# Patient Record
Sex: FEMALE | Race: WHITE | Employment: PART TIME | URBAN - METROPOLITAN AREA
[De-identification: names, ages, dates, MRNs, and addresses within clinical notes are randomized per-mention and may not be internally consistent; named-entity substitution may affect disease eponyms.]

---

## 2018-06-20 ENCOUNTER — HOSPITAL ENCOUNTER (OUTPATIENT)
Dept: GENERAL RADIOLOGY | Age: 58
Discharge: HOME | End: 2018-06-20
Attending: FAMILY MEDICINE
Payer: OTHER MISCELLANEOUS

## 2018-06-20 ENCOUNTER — HOSPITAL ENCOUNTER (EMERGENCY)
Age: 58
Discharge: STILL ADMITTED OR ADMINISTRATIVE | End: 2018-06-20

## 2018-06-20 ENCOUNTER — OFFICE VISIT (OUTPATIENT)
Dept: OTHER | Age: 58
End: 2018-06-20
Attending: FAMILY MEDICINE
Payer: OTHER MISCELLANEOUS
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
## 2018-06-20 PROCEDURE — 73110 X-RAY EXAM OF WRIST: CPT | Performed by: FAMILY MEDICINE

## 2018-06-20 PROCEDURE — 73562 X-RAY EXAM OF KNEE 3: CPT | Performed by: FAMILY MEDICINE

## 2018-06-20 RX ORDER — NAPROXEN 500 MG/1
500 TABLET ORAL 2 TIMES DAILY
Qty: 30 TABLET | Refills: 0 | Status: SHIPPED | OUTPATIENT
Start: 2018-06-20 | End: 2019-05-28

## 2018-06-27 ENCOUNTER — APPOINTMENT (OUTPATIENT)
Dept: OTHER | Age: 58
End: 2018-06-27
Attending: FAMILY MEDICINE
Payer: OTHER MISCELLANEOUS

## 2019-05-28 PROCEDURE — 82955 ASSAY OF G6PD ENZYME: CPT | Performed by: INTERNAL MEDICINE

## 2019-09-19 PROBLEM — D12.0 BENIGN NEOPLASM OF CECUM: Status: ACTIVE | Noted: 2019-09-19

## 2019-09-19 PROBLEM — Z12.11 SPECIAL SCREENING FOR MALIGNANT NEOPLASM OF COLON: Status: ACTIVE | Noted: 2019-09-19

## 2024-06-19 PROCEDURE — 0DC58ZZ EXTIRPATION OF MATTER FROM ESOPHAGUS, VIA NATURAL OR ARTIFICIAL OPENING ENDOSCOPIC: ICD-10-PCS | Performed by: INTERNAL MEDICINE

## 2024-06-20 ENCOUNTER — ANESTHESIA (OUTPATIENT)
Dept: ENDOSCOPY | Facility: HOSPITAL | Age: 64
End: 2024-06-20

## 2024-06-20 ENCOUNTER — APPOINTMENT (OUTPATIENT)
Dept: GENERAL RADIOLOGY | Age: 64
End: 2024-06-20
Attending: EMERGENCY MEDICINE

## 2024-06-20 ENCOUNTER — HOSPITAL ENCOUNTER (EMERGENCY)
Facility: HOSPITAL | Age: 64
Discharge: HOME | End: 2024-06-20
Attending: EMERGENCY MEDICINE

## 2024-06-20 ENCOUNTER — ANESTHESIA EVENT (OUTPATIENT)
Dept: ENDOSCOPY | Facility: HOSPITAL | Age: 64
End: 2024-06-20

## 2024-06-20 VITALS
RESPIRATION RATE: 16 BRPM | WEIGHT: 182 LBS | OXYGEN SATURATION: 100 % | HEART RATE: 64 BPM | BODY MASS INDEX: 30.32 KG/M2 | HEIGHT: 65 IN | SYSTOLIC BLOOD PRESSURE: 135 MMHG | TEMPERATURE: 97 F | DIASTOLIC BLOOD PRESSURE: 78 MMHG

## 2024-06-20 DIAGNOSIS — T18.128A ESOPHAGEAL OBSTRUCTION DUE TO FOOD IMPACTION: ICD-10-CM

## 2024-06-20 DIAGNOSIS — R11.2 NAUSEA AND VOMITING IN ADULT: Primary | ICD-10-CM
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
## 2024-06-20 LAB
ALBUMIN SERPL-MCNC: 4 G/DL (ref 3.4–5)
ALBUMIN/GLOB SERPL: 1 {RATIO} (ref 1–2)
ALP LIVER SERPL-CCNC: 90 U/L
ALT SERPL-CCNC: 26 U/L
ANION GAP SERPL CALC-SCNC: 8 MMOL/L (ref 0–18)
AST SERPL-CCNC: 18 U/L (ref 15–37)
ATRIAL RATE: 85 BPM
BASOPHILS # BLD AUTO: 0.04 X10(3) UL (ref 0–0.2)
BASOPHILS NFR BLD AUTO: 0.6 %
BILIRUB SERPL-MCNC: 0.4 MG/DL (ref 0.1–2)
BUN BLD-MCNC: 19 MG/DL (ref 9–23)
CALCIUM BLD-MCNC: 9.4 MG/DL (ref 8.5–10.1)
CHLORIDE SERPL-SCNC: 104 MMOL/L (ref 98–112)
CO2 SERPL-SCNC: 23 MMOL/L (ref 21–32)
CREAT BLD-MCNC: 0.92 MG/DL
EGFRCR SERPLBLD CKD-EPI 2021: 69 ML/MIN/1.73M2 (ref 60–?)
EOSINOPHIL # BLD AUTO: 0.08 X10(3) UL (ref 0–0.7)
EOSINOPHIL NFR BLD AUTO: 1.2 %
ERYTHROCYTE [DISTWIDTH] IN BLOOD BY AUTOMATED COUNT: 13.1 %
GLOBULIN PLAS-MCNC: 4 G/DL (ref 2.8–4.4)
GLUCOSE BLD-MCNC: 106 MG/DL (ref 70–99)
HCT VFR BLD AUTO: 41.8 %
HGB BLD-MCNC: 14 G/DL
IMM GRANULOCYTES # BLD AUTO: 0.01 X10(3) UL (ref 0–1)
IMM GRANULOCYTES NFR BLD: 0.1 %
LIPASE SERPL-CCNC: 48 U/L (ref 13–75)
LYMPHOCYTES # BLD AUTO: 2.15 X10(3) UL (ref 1–4)
LYMPHOCYTES NFR BLD AUTO: 31 %
MCH RBC QN AUTO: 31.2 PG (ref 26–34)
MCHC RBC AUTO-ENTMCNC: 33.5 G/DL (ref 31–37)
MCV RBC AUTO: 93.1 FL
MONOCYTES # BLD AUTO: 0.46 X10(3) UL (ref 0.1–1)
MONOCYTES NFR BLD AUTO: 6.6 %
NEUTROPHILS # BLD AUTO: 4.2 X10 (3) UL (ref 1.5–7.7)
NEUTROPHILS # BLD AUTO: 4.2 X10(3) UL (ref 1.5–7.7)
NEUTROPHILS NFR BLD AUTO: 60.5 %
OSMOLALITY SERPL CALC.SUM OF ELEC: 283 MOSM/KG (ref 275–295)
P AXIS: 59 DEGREES
P-R INTERVAL: 140 MS
PLATELET # BLD AUTO: 236 10(3)UL (ref 150–450)
POTASSIUM SERPL-SCNC: 3.8 MMOL/L (ref 3.5–5.1)
PROT SERPL-MCNC: 8 G/DL (ref 6.4–8.2)
Q-T INTERVAL: 352 MS
QRS DURATION: 72 MS
QTC CALCULATION (BEZET): 418 MS
R AXIS: 38 DEGREES
RBC # BLD AUTO: 4.49 X10(6)UL
SODIUM SERPL-SCNC: 135 MMOL/L (ref 136–145)
T AXIS: 71 DEGREES
TROPONIN I SERPL HS-MCNC: <3 NG/L
VENTRICULAR RATE: 85 BPM
WBC # BLD AUTO: 6.9 X10(3) UL (ref 4–11)

## 2024-06-20 PROCEDURE — S0028 INJECTION, FAMOTIDINE, 20 MG: HCPCS | Performed by: EMERGENCY MEDICINE

## 2024-06-20 PROCEDURE — 96376 TX/PRO/DX INJ SAME DRUG ADON: CPT

## 2024-06-20 PROCEDURE — 93005 ELECTROCARDIOGRAM TRACING: CPT

## 2024-06-20 PROCEDURE — 99285 EMERGENCY DEPT VISIT HI MDM: CPT

## 2024-06-20 PROCEDURE — 80053 COMPREHEN METABOLIC PANEL: CPT | Performed by: EMERGENCY MEDICINE

## 2024-06-20 PROCEDURE — 96361 HYDRATE IV INFUSION ADD-ON: CPT

## 2024-06-20 PROCEDURE — 88305 TISSUE EXAM BY PATHOLOGIST: CPT | Performed by: INTERNAL MEDICINE

## 2024-06-20 PROCEDURE — 93010 ELECTROCARDIOGRAM REPORT: CPT

## 2024-06-20 PROCEDURE — 96375 TX/PRO/DX INJ NEW DRUG ADDON: CPT

## 2024-06-20 PROCEDURE — 96374 THER/PROPH/DIAG INJ IV PUSH: CPT

## 2024-06-20 PROCEDURE — 71045 X-RAY EXAM CHEST 1 VIEW: CPT | Performed by: EMERGENCY MEDICINE

## 2024-06-20 PROCEDURE — 84484 ASSAY OF TROPONIN QUANT: CPT | Performed by: EMERGENCY MEDICINE

## 2024-06-20 PROCEDURE — 83690 ASSAY OF LIPASE: CPT | Performed by: EMERGENCY MEDICINE

## 2024-06-20 PROCEDURE — 85025 COMPLETE CBC W/AUTO DIFF WBC: CPT | Performed by: EMERGENCY MEDICINE

## 2024-06-20 RX ORDER — ROPINIROLE 1 MG/1
1 TABLET, FILM COATED ORAL DAILY PRN
COMMUNITY

## 2024-06-20 RX ORDER — ACETAMINOPHEN 500 MG
1000 TABLET ORAL ONCE AS NEEDED
Status: DISCONTINUED | OUTPATIENT
Start: 2024-06-20 | End: 2024-06-20 | Stop reason: HOSPADM

## 2024-06-20 RX ORDER — ONDANSETRON 2 MG/ML
4 INJECTION INTRAMUSCULAR; INTRAVENOUS ONCE
Status: COMPLETED | OUTPATIENT
Start: 2024-06-20 | End: 2024-06-20

## 2024-06-20 RX ORDER — VALSARTAN 320 MG/1
320 TABLET ORAL DAILY
COMMUNITY

## 2024-06-20 RX ORDER — LIDOCAINE HYDROCHLORIDE 10 MG/ML
INJECTION, SOLUTION EPIDURAL; INFILTRATION; INTRACAUDAL; PERINEURAL AS NEEDED
Status: DISCONTINUED | OUTPATIENT
Start: 2024-06-20 | End: 2024-06-20 | Stop reason: SURG

## 2024-06-20 RX ORDER — PANTOPRAZOLE SODIUM 40 MG/1
40 TABLET, DELAYED RELEASE ORAL
Qty: 180 TABLET | Refills: 0 | Status: SHIPPED | OUTPATIENT
Start: 2024-06-20

## 2024-06-20 RX ORDER — DEXAMETHASONE SODIUM PHOSPHATE 4 MG/ML
VIAL (ML) INJECTION AS NEEDED
Status: DISCONTINUED | OUTPATIENT
Start: 2024-06-20 | End: 2024-06-20 | Stop reason: SURG

## 2024-06-20 RX ORDER — ONDANSETRON 2 MG/ML
4 INJECTION INTRAMUSCULAR; INTRAVENOUS EVERY 6 HOURS PRN
Status: DISCONTINUED | OUTPATIENT
Start: 2024-06-20 | End: 2024-06-20 | Stop reason: HOSPADM

## 2024-06-20 RX ORDER — METOCLOPRAMIDE HYDROCHLORIDE 5 MG/ML
10 INJECTION INTRAMUSCULAR; INTRAVENOUS EVERY 8 HOURS PRN
Status: DISCONTINUED | OUTPATIENT
Start: 2024-06-20 | End: 2024-06-20 | Stop reason: HOSPADM

## 2024-06-20 RX ORDER — METOPROLOL TARTRATE 1 MG/ML
2.5 INJECTION, SOLUTION INTRAVENOUS ONCE
Status: DISCONTINUED | OUTPATIENT
Start: 2024-06-20 | End: 2024-06-20 | Stop reason: HOSPADM

## 2024-06-20 RX ORDER — MEPERIDINE HYDROCHLORIDE 25 MG/ML
12.5 INJECTION INTRAMUSCULAR; INTRAVENOUS; SUBCUTANEOUS AS NEEDED
Status: DISCONTINUED | OUTPATIENT
Start: 2024-06-20 | End: 2024-06-20 | Stop reason: HOSPADM

## 2024-06-20 RX ORDER — ONDANSETRON 2 MG/ML
INJECTION INTRAMUSCULAR; INTRAVENOUS AS NEEDED
Status: DISCONTINUED | OUTPATIENT
Start: 2024-06-20 | End: 2024-06-20 | Stop reason: SURG

## 2024-06-20 RX ORDER — HYDROMORPHONE HYDROCHLORIDE 1 MG/ML
0.6 INJECTION, SOLUTION INTRAMUSCULAR; INTRAVENOUS; SUBCUTANEOUS EVERY 5 MIN PRN
Status: DISCONTINUED | OUTPATIENT
Start: 2024-06-20 | End: 2024-06-20 | Stop reason: HOSPADM

## 2024-06-20 RX ORDER — HYDROMORPHONE HYDROCHLORIDE 1 MG/ML
0.4 INJECTION, SOLUTION INTRAMUSCULAR; INTRAVENOUS; SUBCUTANEOUS EVERY 5 MIN PRN
Status: DISCONTINUED | OUTPATIENT
Start: 2024-06-20 | End: 2024-06-20 | Stop reason: HOSPADM

## 2024-06-20 RX ORDER — FAMOTIDINE 10 MG/ML
20 INJECTION, SOLUTION INTRAVENOUS ONCE
Status: COMPLETED | OUTPATIENT
Start: 2024-06-20 | End: 2024-06-20

## 2024-06-20 RX ORDER — HYDROCODONE BITARTRATE AND ACETAMINOPHEN 5; 325 MG/1; MG/1
2 TABLET ORAL ONCE AS NEEDED
Status: DISCONTINUED | OUTPATIENT
Start: 2024-06-20 | End: 2024-06-20 | Stop reason: HOSPADM

## 2024-06-20 RX ORDER — SODIUM CHLORIDE, SODIUM LACTATE, POTASSIUM CHLORIDE, CALCIUM CHLORIDE 600; 310; 30; 20 MG/100ML; MG/100ML; MG/100ML; MG/100ML
INJECTION, SOLUTION INTRAVENOUS CONTINUOUS
Status: DISCONTINUED | OUTPATIENT
Start: 2024-06-20 | End: 2024-06-20 | Stop reason: HOSPADM

## 2024-06-20 RX ORDER — ACETAMINOPHEN 500 MG
1000 TABLET ORAL ONCE
Status: DISCONTINUED | OUTPATIENT
Start: 2024-06-20 | End: 2024-06-20

## 2024-06-20 RX ORDER — MELOXICAM 15 MG/1
15 TABLET ORAL DAILY
COMMUNITY

## 2024-06-20 RX ORDER — ROCURONIUM BROMIDE 10 MG/ML
INJECTION, SOLUTION INTRAVENOUS AS NEEDED
Status: DISCONTINUED | OUTPATIENT
Start: 2024-06-20 | End: 2024-06-20 | Stop reason: SURG

## 2024-06-20 RX ORDER — LABETALOL HYDROCHLORIDE 5 MG/ML
5 INJECTION, SOLUTION INTRAVENOUS EVERY 5 MIN PRN
Status: DISCONTINUED | OUTPATIENT
Start: 2024-06-20 | End: 2024-06-20 | Stop reason: HOSPADM

## 2024-06-20 RX ORDER — HYDROMORPHONE HYDROCHLORIDE 1 MG/ML
0.2 INJECTION, SOLUTION INTRAMUSCULAR; INTRAVENOUS; SUBCUTANEOUS EVERY 5 MIN PRN
Status: DISCONTINUED | OUTPATIENT
Start: 2024-06-20 | End: 2024-06-20 | Stop reason: HOSPADM

## 2024-06-20 RX ORDER — NALOXONE HYDROCHLORIDE 0.4 MG/ML
80 INJECTION, SOLUTION INTRAMUSCULAR; INTRAVENOUS; SUBCUTANEOUS AS NEEDED
Status: DISCONTINUED | OUTPATIENT
Start: 2024-06-20 | End: 2024-06-20 | Stop reason: HOSPADM

## 2024-06-20 RX ORDER — MIDAZOLAM HYDROCHLORIDE 1 MG/ML
1 INJECTION INTRAMUSCULAR; INTRAVENOUS EVERY 5 MIN PRN
Status: DISCONTINUED | OUTPATIENT
Start: 2024-06-20 | End: 2024-06-20 | Stop reason: HOSPADM

## 2024-06-20 RX ORDER — HYDROCODONE BITARTRATE AND ACETAMINOPHEN 5; 325 MG/1; MG/1
1 TABLET ORAL ONCE AS NEEDED
Status: DISCONTINUED | OUTPATIENT
Start: 2024-06-20 | End: 2024-06-20 | Stop reason: HOSPADM

## 2024-06-20 RX ORDER — SIMVASTATIN 10 MG
10 TABLET ORAL NIGHTLY
COMMUNITY

## 2024-06-20 RX ADMIN — ROCURONIUM BROMIDE 20 MG: 10 INJECTION, SOLUTION INTRAVENOUS at 08:49:00

## 2024-06-20 RX ADMIN — LIDOCAINE HYDROCHLORIDE 100 MG: 10 INJECTION, SOLUTION EPIDURAL; INFILTRATION; INTRACAUDAL; PERINEURAL at 08:49:00

## 2024-06-20 RX ADMIN — DEXAMETHASONE SODIUM PHOSPHATE 4 MG: 4 MG/ML VIAL (ML) INJECTION at 08:55:00

## 2024-06-20 RX ADMIN — ONDANSETRON 4 MG: 2 INJECTION INTRAMUSCULAR; INTRAVENOUS at 09:03:00

## 2024-06-20 NOTE — ED INITIAL ASSESSMENT (HPI)
Pt arrives to ED via [facility] for evaluation of a food bolus, pt transfers from PED. Pt was originally seen at PED for what she thought was a CP and was diagnosed with a food bolus, sent to ED for GI f/u, pt c/o of mid sternal pain.

## 2024-06-20 NOTE — ED PROVIDER NOTES
Patient arrives from [place].  She is currently hemodynamically stable speaking full sentences.    She still feels the food bolus is stuck.  She states she last vomited about 20 minutes ago.    I just spoke with Dr. Fallon of [facility].  He recommends admission.  N.p.o. status and she will be scoped at some point today.

## 2024-06-20 NOTE — ED PROVIDER NOTES
Patient Seen in: [facility] [place]      History     Chief Complaint   Patient presents with    Chest Pain Angina     Stated Complaint: mid-sternal CP since last night, nauseated. SOB    Subjective:   HPI    64-year-old female past med history of arthritis and hypertension presents ED with complaint of nausea and vomiting since 7 PM.  Reports eating [place] sprouts for dinner but feels like something is stuck.  No vomiting no bile since the evening.  Reports that she is having pain in her lower chest.  It felt slightly short of breath.  Denies any dizziness or lightheadedness.  Complaining of flatulence.    Objective:   Past Medical History:    Anxiety    Arthritis    Back pain    Carpal tunnel syndrome    right    HTN (hypertension)    Indigestion    Osteoporosis    Pain in joints    Sleep disturbance              Past Surgical History:   Procedure Laterality Date    Carpal tunnel release Right     Other  2018    left knee meniscal tear                Social History     Socioeconomic History    Marital status:    Tobacco Use    Smoking status: Former     Current packs/day: 0.00     Average packs/day: 1 pack/day for 10.0 years (10.0 ttl pk-yrs)     Types: Cigarettes     Start date: 1969     Quit date: 1979     Years since quittin.0    Smokeless tobacco: Never   Vaping Use    Vaping status: Never Used   Substance and Sexual Activity    Alcohol use: Never    Drug use: Never              Review of Systems    Positive for stated complaint: mid-sternal CP since last night, nauseated. SOB  Other systems are as noted in HPI.  Constitutional and vital signs reviewed.      All other systems reviewed and negative except as noted above.    Physical Exam     ED Triage Vitals   BP 24 0433 (!) 135/99   Pulse 24 0433 84   Resp 24 0433 16   Temp 24 0542 97.1 °F (36.2 °C)   Temp src 24 0542 Temporal   SpO2 24 0433 96 %   O2 Device 24  0542 None (Room air)       Current Vitals:   Vital Signs  BP: 116/76  Pulse: 73  Resp: 20  Temp: 97.1 °F (36.2 °C)  Temp src: Temporal    Oxygen Therapy  SpO2: 100 %  O2 Device: None (Room air)            Physical Exam  Vitals and nursing note reviewed.   Constitutional:       Appearance: She is well-developed.   HENT:      Head: Normocephalic and atraumatic.   Eyes:      Pupils: Pupils are equal, round, and reactive to light.   Cardiovascular:      Rate and Rhythm: Normal rate and regular rhythm.   Pulmonary:      Effort: Pulmonary effort is normal.      Breath sounds: Normal breath sounds.   Abdominal:      General: Bowel sounds are normal.      Palpations: Abdomen is soft.   Musculoskeletal:         General: No deformity.      Cervical back: Normal range of motion and neck supple.   Skin:     General: Skin is warm and dry.      Capillary Refill: Capillary refill takes less than 2 seconds.   Neurological:      Mental Status: She is alert and oriented to person, place, and time.              ED Course     Labs Reviewed   COMP METABOLIC PANEL (14) - Abnormal; Notable for the following components:       Result Value    Glucose 106 (*)     Sodium 135 (*)     All other components within normal limits   LIPASE - Normal   TROPONIN I HIGH SENSITIVITY - Normal   CBC WITH DIFFERENTIAL WITH PLATELET    Narrative:     The following orders were created for panel order CBC With Differential With Platelet.  Procedure                               Abnormality         Status                     ---------                               -----------         ------                     CBC W/ DIFFERENTIAL[[id number]]                              Final result                 Please view results for these tests on the individual orders.   RAINBOW DRAW BLUE   CBC W/ DIFFERENTIAL     EKG    Rate, intervals and axes as noted on EKG Report.  Rate: 85  Rhythm: Sinus Rhythm  Reading: no gross st elevations or depressions no prior for comparison                   CHEST X-RAY SINGLE AP VIEW   Comparison: None available for review.    IMPRESSION:  No acute cardio or pulmonary process.  Possible hiatal hernia.               MDM      64-year-old female past medical history of gastritis presents ED with complaints of lower midsternal chest pain.  Reports symptoms began around 1900 yesterday evening while eating [place] sprouts and tuna.  Reports that she began having a sharp pain.  Started vomiting overnight and has been vomiting the entire night.  Reports initially she thought it was her gastritis so try drinking half a bottle of Mylanta.  Patient was given ondansetron as well as IV fluids basic labs were obtained EKG was sinus necrosis T changes significant ischemia troponin is negative CBC and electrolytes grossly within normal limits chest x-ray is clear no mediastinal any effusions or infiltrates per minute and evaluation of the chest x-ray.  Patient vomited the Mylanta  even after receiving ondansetron.  There is a white substance in the emesis basin.  Patient was then given glucagon 1 mg IV without improvement.  She was given a carbonated beverage but was still unable to tolerate p.o.  Concern for esophageal impaction.  Patient has seen Dr. Corona in the past last seen in 2019 for colonoscopy.   Discussed case with Dr. Corona, pt to go to [facility] ED for evaluation by GI.                                   Medical Decision Making      Disposition and Plan     Clinical Impression:  1. Nausea and vomiting in adult    2. Esophageal obstruction due to food impaction         Disposition:  Ed to ed  6/20/2024  6:18 am    Follow-up:  No follow-up provider specified.        Medications Prescribed:  Current Discharge Medication List

## 2024-06-20 NOTE — DISCHARGE INSTRUCTIONS
Home Care Instructions for Gastroscopy with Sedation    Diet:  - Resume your regular diet as tolerated unless otherwise instructed.  - Start with light meals to minimize bloating.  - Do not drink alcohol today.    Medication:  - If you have questions about resuming your normal medications, please contact your Primary Care Physician.    Activities:  - Take it easy today. Do not return to work today.  - Do not drive today.  - Do not operate any machinery today (including kitchen equipment).      Gastroscopy:  - You may have a sore throat for 2-3 days following the exam. This is normal. Gargling with warm salt water (1/2 tsp salt to 1 glass warm water) or using throat lozenges will help.  - If you experience any sharp pain in your neck, abdomen or chest, vomiting of blood, oral temperature over 100 degrees Fahrenheit, light-headedness or dizziness, or any other problems, contact your doctor.    **If unable to reach your doctor, please go to the [facility] Emergency Room**    - Your referring physician will receive a full report of your examination.  - If you do not hear from your doctor's office within two weeks of your biopsy, please call them for your results.    You may be able to see your laboratory results in [facility] between 4 and 7 business days.  In some cases, your physician may not have viewed the results before they are released to [facility].  If you have questions regarding your results contact the physician who ordered the test/exam by phone or via [facility] by choosing \"Ask a Medical Question.\"

## 2024-06-20 NOTE — ED INITIAL ASSESSMENT (HPI)
Pt to ED with sharp-midsternal chest pain that started last night after eating. Pt feels like maybe there is food stuck. Pt took mylanta with no improvement. +n/v. Pt reports SOB this morning.

## 2024-06-20 NOTE — H&P
Gastroenterology Initial Consultation  I have personally seen and examined the patient.    Patient Name: Le Ball  Referring physician: Dr. Arellano  Reason for consultation: Esophageal food bolus  CC: Esophageal food bolus  HPI: This is a 63 yo woman with HTN, and who presented to the ER after eating brussel sprouts and having a sense of it getting stuck in the esophagus.  She reports that, over the past 1-2 years, she has had many similar instances, but always able to clear it by expelling it or by it spontaneously passing. She reports acid reflux for which her primary care physician recently started her on Omeprazole once daily.   She thinks that this may have lead to decreased frequency of events.  She reports no chest pain.  She is unable to lay down, as her saliva comes up. However, when upright, she is able to swallow some saliva.    PMHx:   Past Medical History:    Anxiety    Arthritis    Back pain    Carpal tunnel syndrome    right    HTN (hypertension)    Indigestion    Osteoporosis    Pain in joints    Sleep disturbance     PSHx:   Past Surgical History:   Procedure Laterality Date    Carpal tunnel release Right 1997    Other  09/2018    left knee meniscal tear     Medications:    [COMPLETED] ondansetron (Zofran) 4 MG/2ML injection 4 mg  4 mg Intravenous Once    [COMPLETED] glucagon (GlucaGen) injection 1 mg  1 mg Intravenous Once    [COMPLETED] famotidine (Pepcid) 20 mg/2mL injection 20 mg  20 mg Intravenous Once    [COMPLETED] ondansetron (Zofran) 4 MG/2ML injection 4 mg  4 mg Intravenous Once    [COMPLETED] sodium chloride 0.9 % IV bolus 1,000 mL  1,000 mL Intravenous Once    [COMPLETED] ondansetron (Zofran) 4 MG/2ML injection 4 mg  4 mg Intravenous Once     Allergies:   Allergies   Allergen Reactions    Ativan [Lorazepam] NAUSEA AND VOMITING    Hydrocodone NAUSEA AND VOMITING     SocHx:  No history of smoking;  The patient drinks alcohol on social occasions; The patient has no history of IV drug  use or other illicit substances.  FamHx: The patient has no family history of colon cancer or other gastrointestinal malignancies;  No family history of ulcer disease, or inflammatory bowel disease  ROS:  In addition to the pertinent positives described above: As above    PE: /87   Pulse 71   Temp 97.6 °F (36.4 °C) (Temporal)   Resp 16   Ht 5' 5\" (1.651 m)   Wt 182 lb (82.6 kg)   SpO2 100%   BMI 30.29 kg/m²   Gen: AAO x 3, able to speak in complete sentences  HENT: NCAT, EOMI, PERRL, oropharynx is clear with moist mucosal membranes  Eyes: Sclerae are anicteric  Neck:  Supple without nuchal rigidity  Abdomen: Soft, non-tender, non-distended with the presence of bowel sounds; No hepatosplenomegaly; no rebound or guarding; No ascites is clinically apparent; no tympany to percussion  Ext: No peripheral edema or cyanosis  Skin: Warm and dry  Psychiatric: Appropriate mood and congruent affect without obvious depression or anxiety    Labs:   Lab Results   Component Value Date    WBC 6.9 06/20/2024    HGB 14.0 06/20/2024    HCT 41.8 06/20/2024    .0 06/20/2024    CREATSERUM 0.92 06/20/2024    BUN 19 06/20/2024     06/20/2024    K 3.8 06/20/2024     06/20/2024    CO2 23.0 06/20/2024     06/20/2024    CA 9.4 06/20/2024    ALB 4.0 06/20/2024    ALKPHO 90 06/20/2024    BILT 0.4 06/20/2024    AST 18 06/20/2024    ALT 26 06/20/2024    LIP 48 06/20/2024     Recent Labs   Lab 06/20/24 0437   *   BUN 19   CREATSERUM 0.92   CA 9.4   *   K 3.8      CO2 23.0     Recent Labs   Lab 06/20/24 0437   RBC 4.49   HGB 14.0   HCT 41.8   MCV 93.1   MCH 31.2   MCHC 33.5   RDW 13.1   NEPRELIM 4.20   WBC 6.9   .0       Recent Labs   Lab 06/20/24  0437   ALT 26   AST 18       Impression: this is a patient with an esophageal food bolus after eating brussel sprouts.  She is able to swallow some saliva, but also has to spit some out.  She has a background of reflux, which could support  severe esophagitis, peptic stricture, neoplasm.  She has no liquid dysphagia, so probably not a motility disorder, but could be EoE.    Recommendations:   1) Emergent EGD with removal of esophageal food bolus and possible biopsy, or dilation  2)   The risks, benefits, and alternatives were discussed with the patient / POA.  Risks included, but were not limited to, bleeding, perforation, medication effects, cardiac arrhythmias, and aspiration.  After all questions were answered to their satisfaction, a signed, informed, and witnessed consent was obtained.  3) Will likely increase her Omeprazole to twice daily thereafter.

## 2024-06-20 NOTE — ANESTHESIA PREPROCEDURE EVALUATION
PRE-OP EVALUATION    Patient Name: Le Ball    Admit Diagnosis: Nausea and vomiting in adult [R11.2]  Esophageal obstruction due to food impaction [T18.128A, W44.F3XA]    Pre-op Diagnosis: FOOD BOLUS    ESOPHAGOGASTRODUODENOSCOPY (EGD)    Anesthesia Procedure: ESOPHAGOGASTRODUODENOSCOPY (EGD)    Surgeons and Role:     * Manolo Perales MD - Primary    Pre-op vitals reviewed.  Temp: 97.6 °F (36.4 °C)  Pulse: 71  Resp: 16  BP: 125/87  SpO2: 100 %  Body mass index is 30.29 kg/m².    Current medications reviewed.  Hospital Medications:   [COMPLETED] ondansetron (Zofran) 4 MG/2ML injection 4 mg  4 mg Intravenous Once    [COMPLETED] glucagon (GlucaGen) injection 1 mg  1 mg Intravenous Once    [COMPLETED] famotidine (Pepcid) 20 mg/2mL injection 20 mg  20 mg Intravenous Once    [COMPLETED] ondansetron (Zofran) 4 MG/2ML injection 4 mg  4 mg Intravenous Once    [COMPLETED] sodium chloride 0.9 % IV bolus 1,000 mL  1,000 mL Intravenous Once    [COMPLETED] ondansetron (Zofran) 4 MG/2ML injection 4 mg  4 mg Intravenous Once       Outpatient Medications:     Medications Prior to Admission   Medication Sig Dispense Refill Last Dose    rOPINIRole 1 MG Oral Tab Take 1 tablet (1 mg total) by mouth daily as needed.   Taking    simvastatin 10 MG Oral Tab Take 1 tablet (10 mg total) by mouth nightly.   Taking    valsartan 320 MG Oral Tab Take 1 tablet (320 mg total) by mouth daily.   Taking    Meloxicam 15 MG Oral Tab Take 1 tablet (15 mg total) by mouth daily.   Taking    carvedilol 12.5 MG Oral Tab Take 1 mg by mouth 2 (two) times daily.   Taking    ibuprofen 800 MG Oral Tab Take 800 mg by mouth 2 (two) times daily. (Patient not taking: Reported on 6/20/2024)   Not Taking    PEG 3350-KCl-NaBcb-NaCl-NaSulf (PEG 3350/ELECTROLYTES) 240 g Oral Recon Soln Take as directed by your physician. (Patient not taking: Reported on 6/20/2024) 1 Bottle 0 Not Taking    Valsartan-hydroCHLOROthiazide 320-25 MG Oral Tab Take 1 tablet by mouth  daily. (Patient not taking: Reported on 2024)  0 Not Taking       Allergies: Ativan [lorazepam] and Hydrocodone      Anesthesia Evaluation    Patient summary reviewed.    Anesthetic Complications           GI/Hepatic/Renal                                 Cardiovascular                  (+) hypertension                                     Endo/Other                                  Pulmonary                           Neuro/Psych                              Patient Active Problem List:     Special screening for malignant neoplasm of colon     Benign neoplasm of cecum     Nausea and vomiting in adult     Esophageal obstruction due to food impaction            Past Surgical History:   Procedure Laterality Date    Carpal tunnel release Right 1997    Other  2018    left knee meniscal tear     Social History     Socioeconomic History    Marital status:    Tobacco Use    Smoking status: Former     Current packs/day: 0.00     Average packs/day: 1 pack/day for 10.0 years (10.0 ttl pk-yrs)     Types: Cigarettes     Start date: 1969     Quit date: 1979     Years since quittin.0    Smokeless tobacco: Never   Vaping Use    Vaping status: Never Used   Substance and Sexual Activity    Alcohol use: Never    Drug use: Never     History   Drug Use Unknown     Available pre-op labs reviewed.  Lab Results   Component Value Date    WBC 6.9 2024    RBC 4.49 2024    HGB 14.0 2024    HCT 41.8 2024    MCV 93.1 2024    MCH 31.2 2024    MCHC 33.5 2024    RDW 13.1 2024    .0 2024     Lab Results   Component Value Date     (L) 2024    K 3.8 2024     2024    CO2 23.0 2024    BUN 19 2024    CREATSERUM 0.92 2024     (H) 2024    CA 9.4 2024            Airway      Mallampati: II  Mouth opening: >3 FB  TM distance: > 6 cm  Neck ROM: full Cardiovascular    Cardiovascular exam normal.          Dental    Dentition appears grossly intact         Pulmonary    Pulmonary exam normal.                 Other findings              ASA: 2   Plan: general  NPO status verified and patient meets guidelines.        Comment:  I explained intrinsic risks of general anesthesia, including nausea, dental damage, sore throat, mouth injury,and hoarseness from airway management.  All questions were answered and understanding was demonstrated of risks.  Informed permission was obtained to proceed as documented in the signed consent form.        Plan/risks discussed with: patient                Present on Admission:  **None**

## 2024-06-20 NOTE — ANESTHESIA POSTPROCEDURE EVALUATION
[facility]    Le Ball Patient Status:  Emergency   Age/Gender 64 year old female MRN [id number]   Location [facility] POST ANESTHESIA CARE UNIT Attending No att. providers found   Hosp Day # 0 PCP TRISH PAULINO       Anesthesia Post-op Note    ESOPHAGOGASTRODUODENOSCOPY (EGD) with food bolus removal and biopsies    Procedure Summary       Date: 06/20/24 Room / Location:  ENDOSCOPY 02 /  ENDOSCOPY    Anesthesia Start: 0847 Anesthesia Stop: 0919    Procedure: ESOPHAGOGASTRODUODENOSCOPY (EGD) with food bolus removal and biopsies Diagnosis: (GE junction sticture, foreign body esophagus)    Surgeons: Manolo Perales MD Anesthesiologist: David Davidson MD    Anesthesia Type: general ASA Status: 2            Anesthesia Type: general    Vitals Value Taken Time   /82 06/20/24 0920   Temp 97.7 °F (36.5 °C) 06/20/24 0915   Pulse 74 06/20/24 0920   Resp 16 06/20/24 0920   SpO2 98 % 06/20/24 0920       Patient Location: PACU    Anesthesia Type: general    Airway Patency: patent    Postop Pain Control: adequate    Mental Status: mildly sedated but able to meaningfully participate in the post-anesthesia evaluation    Nausea/Vomiting: none    Cardiopulmonary/Hydration status: stable euvolemic    Complications: no apparent anesthesia related complications    Postop vital signs: stable    Dental Exam: Unchanged from Preop    Patient to be discharged from PACU when criteria met.

## 2024-06-20 NOTE — ANESTHESIA PROCEDURE NOTES
Airway  Date/Time: 6/20/2024 8:50 AM  Urgency: elective      General Information and Staff    Patient location during procedure: OR  Anesthesiologist: David Davidson MD  Performed: anesthesiologist   Performed by: David Davidson MD  Authorized by: David Davidson MD      Indications and Patient Condition  Indications for airway management: airway protection and anesthesia  Sedation level: deep  Preoxygenated: yes  Patient position: sniffing  Mask difficulty assessment: 1 - vent by mask    Final Airway Details  Final airway type: endotracheal airway      Successful airway: ETT  Cuffed: yes   Successful intubation technique: direct laryngoscopy  Endotracheal tube insertion site: oral  Blade: Wei  Blade size: #3  ETT size (mm): 7.0    Cormack-Lehane Classification: grade I - full view of glottis  Placement verified by: capnometry   Measured from: lips  ETT to lips (cm): 22  Number of attempts at approach: 1

## 2024-06-20 NOTE — OPERATIVE REPORT
EGD operative report  Patient Name: Le Ball  Procedure: Esophagogastroduodenoscopy with cold forceps biopsy and removal of esophageal food bolus  Date of procedure: 6/20/2024    Pre-operative Indication: Esophageal food bolus  Post-operative findings: Distal esophageal narrowing with esophageal food bolus;  Inflammatory tissue at EGJxn.  Attending: Manolo Perales M.D.  Consent:  The risks, benefits, and alternatives were discussed with the patient / POA.  Risks included, but were not limited to, bleeding, perforation, medication effects, cardiac arrhythmias, and aspiration.  After all questions were answered to their satisfaction, a signed, informed, and witnessed consent was obtained.  Timeout:  Prior to initiation of sedation, a formal timeout was performed, confirming the patient's name, date of birth, allergies, correct procedure, and need for antibiotics.  The operating physician and sedating physician was also confirmed prior to initiation of sedation.     Sedation: Monitored Anesthesia Care.  Monitoring:  Pulsoximetry, pulse, respirations, and blood pressure were monitored throughout the entire procedure  Procedure: After achieving adequate sedation and placing the patient in the left lateral decubitus position, the lubricated upper endoscope was introduced into the mouth and advanced to the descending duodenum.  The endoscope was then withdrawn into the gastric antrum and placed in a retroflexed position.  The endoscope was then righted, and air was suctioned from the stomach.  The endoscope was then withdrawn from the patient, with careful visual inspection of the mucosa revealing no additional pathologic findings.  The patient tolerated the procedure without apparent procedural complications.  The patient left the procedure room in stable condition for recovery.  Findings: Esophagus: The mucosa was normal, without masses, polyps, ulcers, erosions, diverticula, or varices.  The squamocolumnar  junction / esophagogastric junction / diaphragmatic impression were appreciated at 40 cm from the incisors.  The EGJxn revealed some mild luminal narrowing, but no distinct stricture.  At the cardia side of the EGJxn, there was a circumferential inflammatory tissue for which cold forceps biopsies were obtained.  Additional cold forceps biopsies were obtained from the mid-esophagus to evaluate for Eosinophilic Esophagitis. In the distal esophagus, a single brussel sprout was appreciated.  This was able to be easily passed into the stomach, clearing the obstruction.      Stomach:  The gastric body, antrum, fundus, cardia, and angularis were normal, without masses, polyps, ulcers, erosions, diverticula, or varices.  Cold forceps biopsies were obtained from the antrum, body, and fundus, to evaluate for H.pylori.   Duodenum: The duodenal bulb, post-bulbar duodenum, and descending duodenum were normal, without masses, polyps, ulcers, erosions, diverticula, or varices.  Cold forceps biopsies were obtained from the distal and proximal duodenum to evaluate for Celiac sprue.  Impression: Findings as above suggest an inflammatory narrowing at the EGJxn.    Recommendations:   1) Increase Omeprazole to 40 mg po bid  2) Await pathology  3) Follow-up with Dr. Corona for possible repeat EGD in 8-12 weeks.  4) Discharge home

## (undated) DEVICE — KIT CUSTOM ENDOPROCEDURE STERIS

## (undated) DEVICE — KIT VLV 5 PC AIR H2O SUCT BX ENDOGATOR CONN

## (undated) DEVICE — 10FT COMBINED O2 DELIVERY/CO2 MONITORING. FILTER WITH MICROSTREAM TYPE LUER: Brand: DUAL ADULT NASAL CANNULA

## (undated) DEVICE — 1200CC GUARDIAN II: Brand: GUARDIAN

## (undated) DEVICE — BITEBLOCK ENDOSCP 60FR MAXI STRP

## (undated) DEVICE — GIJAW SINGLE-USE BIOPSY FORCEPS WITH NEEDLE: Brand: GIJAW

## (undated) DEVICE — 3M™ RED DOT™ MONITORING ELECTRODE WITH FOAM TAPE AND STICKY GEL, 50/BAG, 20/CASE, 72/PLT 2570: Brand: RED DOT™

## (undated) NOTE — Clinical Note
Date:6/20/2024  Patient:Le Ball  Attending Provider:Sylvia Lopez DO Jacqueline L Quinn was transferred to [facility] emergency department for evaluation by gastroenterology. Dr. Arellano was notified.  Condition at time of transf er was Stable.

## (undated) NOTE — LETTER
[address]  [address]  Authorization for Surgical Operation and Procedure     Date:___________                                                                                                         Time:__________  I hereby authorize Surgeon(s):  Manolo Perales MD, my physician and his/her assistants (if applicable), which may include medical students, residents, and/or fellows, to perform the following surgical operation/ procedure and administer such anesthesia as may be determined necessary by my physician:  Operation/Procedure name (s) Procedure(s):  ESOPHAGOGASTRODUODENOSCOPY (EGD) on Le Ball   2.   I recognize that during the surgical operation/procedure, unforeseen conditions may necessitate additional or different procedures than those listed above.  I, therefore, further authorize and request that the above-named surgeon, assistants, or designees perform such procedures as are, in their judgment, necessary and desirable.    3.   My surgeon/physician has discussed prior to my surgery the potential benefits, risks and side effects of this procedure; the likelihood of achieving goals; and potential problems that might occur during recuperation.  They also discussed reasonable alternatives to the procedure, including risks, benefits, and side effects related to the alternatives and risks related to not receiving this procedure.  I have had all my questions answered and I acknowledge that no guarantee has been made as to the result that may be obtained.    4.   Should the need arise during my operation/procedure, which includes change of level of care prior to discharge, I also consent to the administration of blood and/or blood products.  Further, I understand that despite careful testing and screening of blood or blood products by collecting agencies, I may still be subject to ill effects as a result of receiving a blood transfusion and/or blood products.   The following are some, but not all, of the potential risks that can occur: fever and allergic reactions, hemolytic reactions, transmission of diseases such as Hepatitis, AIDS and Cytomegalovirus (CMV) and fluid overload.  In the event that I wish to have an autologous transfusion of my own blood, or a directed donor transfusion, I will discuss this with my physician.  Check only if Refusing Blood or Blood Products  I understand refusal of blood or blood products as deemed necessary by my physician may have serious consequences to my condition to include possible death. I hereby assume responsibility for my refusal and release the hospital, its personnel, and my physicians from any responsibility for the consequences of my refusal.          o  Refuse      5.   I authorize the use of any specimen, organs, tissues, body parts or foreign objects that may be removed from my body during the operation/procedure for diagnosis, research or teaching purposes and their subsequent disposal by hospital authorities.  I also authorize the release of specimen test results and/or written reports to my treating physician on the hospital medical staff or other referring or consulting physicians involved in my care, at the discretion of the Pathologist or my treating physician.    6.   I consent to the photographing or videotaping of the operations or procedures to be performed, including appropriate portions of my body for medical, scientific, or educational purposes, provided my identity is not revealed by the pictures or by descriptive texts accompanying them.  If the procedure has been photographed/videotaped, the surgeon will obtain the original picture, image, videotape or CD.  The hospital will not be responsible for storage, release or maintenance of the picture, image, tape or CD.    7.   I consent to the presence of a  or observers in the operating room as deemed necessary by my physician or their  designees.    8.   I recognize that in the event my procedure results in extended X-Ray/fluoroscopy time, I may develop a skin reaction.    9. If I have a Do Not Attempt Resuscitation (DNAR) order in place, that status will be suspended while in the operating room, procedural suite, and during the recovery period unless otherwise explicitly stated by me (or a person authorized to consent on my behalf). The surgeon or my attending physician will determine when the applicable recovery period ends for purposes of reinstating the DNAR order.  10. Patients having a sterilization procedure: I understand that if the procedure is successful the results will be permanent and it will therefore be impossible for me to inseminate, conceive, or bear children.  I also understand that the procedure is intended to result in sterility, although the result has not been guaranteed.   11. I acknowledge that my physician has explained sedation/analgesia administration to me including the risk and benefits I consent to the administration of sedation/analgesia as may be necessary or desirable in the judgment of my physician.    I CERTIFY THAT I HAVE READ AND FULLY UNDERSTAND THE ABOVE CONSENT TO OPERATION and/or OTHER PROCEDURE.    _________________________________________  __________________________________  Signature of Patient     Signature of Responsible Person         ___________________________________         Printed Name of Responsible Person           _________________________________                 Relationship to Patient  _________________________________________  ______________________________  Signature of Witness          Date  Time      Patient Name: Le MANTILLA Carogabby     : 1960                 Printed: 2024     Medical Record #: [id number]                     Page [address] of [address] [place] [address]  [id number]    Consent for Anesthesia    I,  Le ANNALEE Quinn agree to be cared for by an anesthesiologist, who is specially trained to monitor me and give me medicine to put me to sleep or keep me comfortable during my procedure    I understand that my anesthesiologist is not an employee or agent of [facility] [employer]. He or she works for [employer].    As the patient asking for anesthesia services, I agree to:  Allow the anesthesiologist (anesthesia doctor) to give me medicine and do additional procedures as necessary. Some examples are: Starting or using an “IV” to give me medicine, fluids or blood during my procedure, and having a breathing tube placed to help me breathe when I’m asleep (intubation). In the event that my heart stops working properly, I understand that my anesthesiologist will make every effort to sustain my life, unless otherwise directed by [facility] Do Not Resuscitate documents.  Tell my anesthesia doctor before my procedure:  If I am pregnant.  The last time that I ate or drank.  All of the medicines I take (including prescriptions, herbal supplements, and pills I can buy without a prescription (including street drugs/illegal medications). Failure to inform my anesthesiologist about these medicines may increase my risk of anesthetic complications.  If I am allergic to anything or have had a reaction to anesthesia before.  I understand how the anesthesia medicine will help me (benefits).  I understand that with any type of anesthesia medicine there are risks:  The most common risks are: nausea, vomiting, sore throat, muscle soreness, damage to my eyes, mouth, or teeth (from breathing tube placement).  Rare risks include: remembering what happened during my procedure, allergic reactions to medications, injury to my airway, heart, lungs, vision, nerves, or muscles and in extremely rare instances death.  My doctor has explained to me other choices available to me for my care  (alternatives).  Pregnant Patients (“epidural”):  I understand that the risks of having an epidural (medicine given into my back to help control pain during labor), include itching, low blood pressure, difficulty urinating, headache or slowing of the baby’s heart. Very rare risks include infection, bleeding, seizure, irregular heart rhythms and nerve injury.  Regional Anesthesia (“spinal”, “epidural”, & “nerve blocks”):  I understand that rare but potential complications include headache, bleeding, infection, seizure, irregular heart rhythms, and nerve injury.    I can change my mind about having anesthesia services at any time before I get the medicine.    _____________________________________________________________________________  Patient (or Representative) Signature/Relationship to Patient  Date   Time    _____________________________________________________________________________   Name (if used)    Language/Organization   Time    _____________________________________________________________________________  Anesthesiologist Signature     Date   Time  I have discussed the procedure and information above with the patient (or patient’s representative) and answered their questions. The patient or their representative has agreed to have anesthesia services.    _____________________________________________________________________________  Witness        Date   Time  I have verified that the signature is that of the patient or patient’s representative, and that it was signed before the procedure  Patient Name: Le Ball     : 1960                 Printed: 2024     Medical Record #: [id number]                     Page 2 of 2